# Patient Record
Sex: FEMALE | Race: ASIAN | NOT HISPANIC OR LATINO | URBAN - METROPOLITAN AREA
[De-identification: names, ages, dates, MRNs, and addresses within clinical notes are randomized per-mention and may not be internally consistent; named-entity substitution may affect disease eponyms.]

---

## 2021-11-18 ENCOUNTER — INPATIENT (INPATIENT)
Facility: HOSPITAL | Age: 71
LOS: 0 days | Discharge: HOME | DRG: 83 | End: 2021-11-19
Attending: INTERNAL MEDICINE | Admitting: INTERNAL MEDICINE
Payer: MEDICARE

## 2021-11-18 VITALS
RESPIRATION RATE: 19 BRPM | OXYGEN SATURATION: 98 % | TEMPERATURE: 98 F | DIASTOLIC BLOOD PRESSURE: 86 MMHG | WEIGHT: 130.95 LBS | SYSTOLIC BLOOD PRESSURE: 147 MMHG | HEIGHT: 65 IN | HEART RATE: 108 BPM

## 2021-11-18 LAB
ALBUMIN SERPL ELPH-MCNC: 4.6 G/DL — SIGNIFICANT CHANGE UP (ref 3.3–5)
ALP SERPL-CCNC: 53 U/L — SIGNIFICANT CHANGE UP (ref 40–120)
ALT FLD-CCNC: 25 U/L — SIGNIFICANT CHANGE UP (ref 10–45)
ANION GAP SERPL CALC-SCNC: 12 MMOL/L — SIGNIFICANT CHANGE UP (ref 5–17)
APTT BLD: 33.5 SEC — SIGNIFICANT CHANGE UP (ref 27.5–35.5)
AST SERPL-CCNC: 23 U/L — SIGNIFICANT CHANGE UP (ref 10–40)
BASOPHILS # BLD AUTO: 0.03 K/UL — SIGNIFICANT CHANGE UP (ref 0–0.2)
BASOPHILS NFR BLD AUTO: 0.3 % — SIGNIFICANT CHANGE UP (ref 0–2)
BILIRUB SERPL-MCNC: 0.5 MG/DL — SIGNIFICANT CHANGE UP (ref 0.2–1.2)
BLD GP AB SCN SERPL QL: NEGATIVE — SIGNIFICANT CHANGE UP
BUN SERPL-MCNC: 8 MG/DL — SIGNIFICANT CHANGE UP (ref 7–23)
CALCIUM SERPL-MCNC: 9.2 MG/DL — SIGNIFICANT CHANGE UP (ref 8.4–10.5)
CHLORIDE SERPL-SCNC: 95 MMOL/L — LOW (ref 96–108)
CO2 SERPL-SCNC: 23 MMOL/L — SIGNIFICANT CHANGE UP (ref 22–31)
CREAT SERPL-MCNC: 0.49 MG/DL — LOW (ref 0.5–1.3)
EOSINOPHIL # BLD AUTO: 0.02 K/UL — SIGNIFICANT CHANGE UP (ref 0–0.5)
EOSINOPHIL NFR BLD AUTO: 0.2 % — SIGNIFICANT CHANGE UP (ref 0–6)
GLUCOSE SERPL-MCNC: 227 MG/DL — HIGH (ref 70–99)
HCT VFR BLD CALC: 38.4 % — SIGNIFICANT CHANGE UP (ref 34.5–45)
HGB BLD-MCNC: 12.6 G/DL — SIGNIFICANT CHANGE UP (ref 11.5–15.5)
IMM GRANULOCYTES NFR BLD AUTO: 0.5 % — SIGNIFICANT CHANGE UP (ref 0–1.5)
INR BLD: 1.15 RATIO — SIGNIFICANT CHANGE UP (ref 0.88–1.16)
LYMPHOCYTES # BLD AUTO: 1.4 K/UL — SIGNIFICANT CHANGE UP (ref 1–3.3)
LYMPHOCYTES # BLD AUTO: 11.7 % — LOW (ref 13–44)
MCHC RBC-ENTMCNC: 30.4 PG — SIGNIFICANT CHANGE UP (ref 27–34)
MCHC RBC-ENTMCNC: 32.8 GM/DL — SIGNIFICANT CHANGE UP (ref 32–36)
MCV RBC AUTO: 92.8 FL — SIGNIFICANT CHANGE UP (ref 80–100)
MONOCYTES # BLD AUTO: 0.71 K/UL — SIGNIFICANT CHANGE UP (ref 0–0.9)
MONOCYTES NFR BLD AUTO: 5.9 % — SIGNIFICANT CHANGE UP (ref 2–14)
NEUTROPHILS # BLD AUTO: 9.74 K/UL — HIGH (ref 1.8–7.4)
NEUTROPHILS NFR BLD AUTO: 81.4 % — HIGH (ref 43–77)
NRBC # BLD: 0 /100 WBCS — SIGNIFICANT CHANGE UP (ref 0–0)
PLATELET # BLD AUTO: 262 K/UL — SIGNIFICANT CHANGE UP (ref 150–400)
POTASSIUM SERPL-MCNC: 3.6 MMOL/L — SIGNIFICANT CHANGE UP (ref 3.5–5.3)
POTASSIUM SERPL-SCNC: 3.6 MMOL/L — SIGNIFICANT CHANGE UP (ref 3.5–5.3)
PROT SERPL-MCNC: 7.6 G/DL — SIGNIFICANT CHANGE UP (ref 6–8.3)
PROTHROM AB SERPL-ACNC: 13.7 SEC — HIGH (ref 10.6–13.6)
RBC # BLD: 4.14 M/UL — SIGNIFICANT CHANGE UP (ref 3.8–5.2)
RBC # FLD: 12.1 % — SIGNIFICANT CHANGE UP (ref 10.3–14.5)
RH IG SCN BLD-IMP: POSITIVE — SIGNIFICANT CHANGE UP
RH IG SCN BLD-IMP: POSITIVE — SIGNIFICANT CHANGE UP
SARS-COV-2 RNA SPEC QL NAA+PROBE: SIGNIFICANT CHANGE UP
SODIUM SERPL-SCNC: 130 MMOL/L — LOW (ref 135–145)
WBC # BLD: 11.96 K/UL — HIGH (ref 3.8–10.5)
WBC # FLD AUTO: 11.96 K/UL — HIGH (ref 3.8–10.5)

## 2021-11-18 PROCEDURE — 70450 CT HEAD/BRAIN W/O DYE: CPT | Mod: 26,MA

## 2021-11-18 PROCEDURE — 71275 CT ANGIOGRAPHY CHEST: CPT | Mod: 26,MA

## 2021-11-18 PROCEDURE — 99284 EMERGENCY DEPT VISIT MOD MDM: CPT

## 2021-11-18 PROCEDURE — 93010 ELECTROCARDIOGRAM REPORT: CPT

## 2021-11-18 PROCEDURE — 72125 CT NECK SPINE W/O DYE: CPT | Mod: 26,MA

## 2021-11-18 PROCEDURE — 74177 CT ABD & PELVIS W/CONTRAST: CPT | Mod: 26,MA

## 2021-11-18 RX ORDER — MORPHINE SULFATE 50 MG/1
2 CAPSULE, EXTENDED RELEASE ORAL ONCE
Refills: 0 | Status: DISCONTINUED | OUTPATIENT
Start: 2021-11-18 | End: 2021-11-18

## 2021-11-18 RX ORDER — ACETAMINOPHEN 500 MG
650 TABLET ORAL ONCE
Refills: 0 | Status: COMPLETED | OUTPATIENT
Start: 2021-11-18 | End: 2021-11-18

## 2021-11-18 RX ADMIN — Medication 650 MILLIGRAM(S): at 18:57

## 2021-11-18 NOTE — ED PROVIDER NOTE - NS ED ROS FT
ROS:  -Constitutional: Denies fever  -Head: Denies headache  -Eyes: Denies blurry vision  -Cardiovascular: Denies chest pain  -Pulmonary: Denies shortness of breath  -Gastrointestinal: abdominal pain  -Genitourinary: Denies dysuria  -Skin: Denies new rashes  -Neuro: Denies numbness

## 2021-11-18 NOTE — ED PROVIDER NOTE - RAPID ASSESSMENT
71y F presents to ED s/p fall at 1pm today. Per son, pt went to water plants on 2nd floor and fell down 12 steps today. +LOC. Pt unsure how she fell. Pt seen by PCP who sent her for CT that showed SDH and referred to ED. Reports rib pain as well, had CXR that was reportedly wnml.  Pt was given Tylenol 3 hours PTA for headache. Pt also endorses nausea. On baby aspirin qd. Denies vomiting.    Patient was seen as a tele QDOC patient. The patient will be seen and further worked up in the main emergency department and their care will be completed by the main emergency department team along with a thorough physical exam. Receiving team will follow up on labs, analgesia, any clinical imaging, reassess and disposition as clinically indicated, all decisions regarding the progression of care will be made at their discretion.    Scribe Statement: Alexa GAYLE, attest that this documentation has been prepared under the direction and in the presence of Luca García) 71y F presents to ED s/p fall at 1pm today. Per son, pt went to water plants on 2nd floor and fell down 12 steps today. +LOC. Pt unsure how she fell. Pt seen by PCP who sent her for CT that showed SDH and referred to ED. Reports rib pain as well, had CXR that was reportedly wnml.  Pt was given Tylenol 3 hours PTA for headache. Pt also endorses nausea. On baby aspirin qd. Denies vomiting.    Patient was seen as a tele QDOC patient. The patient will be seen and further worked up in the main emergency department and their care will be completed by the main emergency department team along with a thorough physical exam. Receiving team will follow up on labs, analgesia, any clinical imaging, reassess and disposition as clinically indicated, all decisions regarding the progression of care will be made at their discretion.    Scribe Statement: Alexa GAYLE, attest that this documentation has been prepared under the direction and in the presence of Luca García)    Patient was seen as a QPA patient. The patient will be seen and further worked up in the main emergency department and their care will be completed by the main emergency department team along with a thorough physical exam. Receiving team will follow up on labs, analgesia, any clinical imaging, reassess and disposition as clinically indicated, all decisions regarding the progression of care will be made at their discretion. 71y F presents to ED s/p fall at 1pm today. Per son, pt went to water plants on 2nd floor and fell down 12 steps today. +LOC. Pt unsure how she fell. Pt seen by PCP who sent her for CT that showed SDH and referred to ED. Reports rib pain as well, had CXR that was reportedly wnml.  Pt was given Tylenol 3 hours PTA for headache. Pt also endorses nausea. On baby aspirin qd. Denies vomiting.    Patient was seen as a tele QDOC patient. The patient will be seen and further worked up in the main emergency department and their care will be completed by the main emergency department team along with a thorough physical exam. Receiving team will follow up on labs, analgesia, any clinical imaging, reassess and disposition as clinically indicated, all decisions regarding the progression of care will be made at their discretion.    Scribe Statement: I, Alexa Mccabe, attest that this documentation has been prepared under the direction and in the presence of Luca García (PA)    Patient was seen as a QPA patient. The patient will be seen and further worked up in the main emergency department and their care will be completed by the main emergency department team along with a thorough physical exam. Receiving team will follow up on labs, analgesia, any clinical imaging, reassess and disposition as clinically indicated, all decisions regarding the progression of care will be made at their discretion.    Attending MD Ding: This patient was seen and orders were placed by the PA as per our department's QPA model.  I was not consulted in regards to this patient although I was present and available in the Emergency Department to the PA.  Patient was to be sent to main ED for full medical evaluation and receiving team was to follow up on any labs, analgesia, clinical imaging ordered by the PA.  Any reassessment and disposition decisions were to be made by receiving team as clinically indicated, all decisions regarding the progression of care to be made at their discretion.  I did not perform a comprehensive history and physical on this patient.

## 2021-11-18 NOTE — ED PROVIDER NOTE - CLINICAL SUMMARY MEDICAL DECISION MAKING FREE TEXT BOX
71 year old female with PMH DM, HTN not on AC presents s/p fall at 1 pm today. Per son patient went to water plants on 2nd floor when she passed out without any preceding symptoms and tumbled down 12 stairs. Patient has had episode of syncope in past but states this felt different. Patient was able to ambulate after fall, went in bed and syncopized again. Denies preceding chest pain, shortness of breath, dizziness, abdominal pain. Pt was seen by PCP who did EKG, CXR and referred for outpatient CT head which demonstrated subarachnoid hemorrhage and advised to go to ED. Pt currently c/o pleuritic chest pain, chest wall pain, right hip pain, diffuse back pain. Took Tylenol 3 hours prior to arrival. Evaluating for ICH, fractures, PE, ACS, electrolyte abnormalities. Will do CT, labs, EKG, treat pain and re-evaluate. Zenaida Underwood,  PGY-1   71 year old female with PMH DM, HTN not on AC presents s/p fall at 1 pm today. Per son patient went to water plants on 2nd floor when she passed out without any preceding symptoms and tumbled down 12 stairs. Patient has had episode of syncope in past but states this felt different. Patient was able to ambulate after fall, went in bed and syncopized again. Denies preceding chest pain, shortness of breath, dizziness, abdominal pain. Pt was seen by PCP who did EKG, CXR and referred for outpatient CT head which demonstrated subarachnoid hemorrhage and advised to go to ED. Pt currently c/o pleuritic chest pain, chest wall pain, right hip pain, diffuse back pain. Took Tylenol 3 hours prior to arrival. Evaluating for ICH, fractures, PE, ACS, electrolyte abnormalities. Will do CT, labs, EKG, treat pain and re-evaluate. Zenaida Underwood,  PGY-1   71 year old female with PMH DM, HTN not on AC presents s/p fall at 1 pm today. Per son patient went to water plants on 2nd floor when she passed out without any preceding symptoms and tumbled down 12 stairs. Patient has had episode of syncope in past but states this felt different. Patient was able to ambulate after fall, went in bed and syncopized again. Denies preceding chest pain, shortness of breath, dizziness, abdominal pain. Pt was seen by PCP who did EKG, CXR and referred for outpatient CT head which demonstrated subdural hemorrhage and advised to go to ED. Pt currently c/o pleuritic chest pain, chest wall pain, right hip pain, diffuse back pain. Took Tylenol 3 hours prior to arrival. Evaluating for ICH, fractures, PE, ACS, electrolyte abnormalities. Will do CT, labs, EKG, treat pain and re-evaluate. Zenaida Underwood,  PGY-1   71 year old female with PMH DM, HTN not on AC presents s/p fall at 1 pm today. Per son patient went to water plants on 2nd floor when she passed out without any preceding symptoms and tumbled down 12 stairs. Patient has had episode of syncope in past but states this felt different. Patient was able to ambulate after fall, went in bed and syncopized again. Denies preceding chest pain, shortness of breath, dizziness, abdominal pain. Pt was seen by PCP who did EKG, CXR and referred for outpatient CT head at 3 PM which demonstrated subdural hemorrhage and advised to go to ED. Pt currently c/o pleuritic chest pain, chest wall pain, right hip pain, diffuse back pain. Took Tylenol 3 hours prior to arrival. Evaluating for ICH, fractures, PE, ACS, electrolyte abnormalities. Will do CT, labs, EKG, treat pain and re-evaluate.

## 2021-11-18 NOTE — ED PROVIDER NOTE - OBJECTIVE STATEMENT
71 year old female with PMH DM, HTN not on AC presents s/p fall at 1 pm today. Per son patient went to water plants on 2nd floor when she passed out without any preceding symptoms and tumbled down 12 stairs. Patient has had episode of syncope in past but states this felt different. Denies preceding chest pain, shortness of breath, dizziness, abdominal pain. Pt was seen by PCP who did EKG, CXR and referred for outpatient CT head which demonstrated subarachnoid hemorrhage and advised to go to ED. Pt currently c/o pleural 71 year old female with PMH DM, HTN not on AC presents s/p fall at 1 pm today. Per son patient went to water plants on 2nd floor when she passed out without any preceding symptoms and tumbled down 12 stairs. Patient has had episode of syncope in past but states this felt different. Patient was able to ambulate after fall, went in bed and syncopized again. Denies preceding chest pain, shortness of breath, dizziness, abdominal pain. Pt was seen by PCP who did EKG, CXR and referred for outpatient CT head which demonstrated subarachnoid hemorrhage and advised to go to ED. Pt currently c/o pleural chest pain, chest wall pain, right hip pain, diffuse back pain. Took Tylenol 3 hours prior to arrival. 71 year old female with PMH DM, HTN not on AC presents s/p fall at 1 pm today. Per son patient went to water plants on 2nd floor when she passed out without any preceding symptoms and tumbled down 12 stairs. Patient has had episode of syncope in past but states this felt different. Patient was able to ambulate after fall, went in bed and syncopized again. Denies preceding chest pain, shortness of breath, dizziness, abdominal pain. Pt was seen by PCP who did EKG, CXR and referred for outpatient CT head which demonstrated subdural hemorrhage and advised to go to ED. Pt currently c/o pleural chest pain, chest wall pain, right hip pain, diffuse back pain. Took Tylenol 3 hours prior to arrival. 71 year old female with PMH DM, HTN not on AC presents s/p fall at 1 pm today. Per son patient went to water plants on 2nd floor when she passed out without any preceding symptoms and tumbled down 12 stairs. Patient has had episode of syncope in past but states this felt different. Patient was able to ambulate after fall, went in bed and syncopized again. Denies preceding chest pain, shortness of breath, dizziness, abdominal pain. Pt was seen by PCP who did EKG, CXR and referred for outpatient CT head at 3 PM which demonstrated subdural hemorrhage and advised to go to ED. Pt currently c/o pleural chest pain, chest wall pain, right hip pain, diffuse back pain. Took Tylenol 3 hours prior to arrival.

## 2021-11-18 NOTE — ED ADULT NURSE NOTE - NSIMPLEMENTINTERV_GEN_ALL_ED
Implemented All Fall Risk Interventions:  [place] to call system. Call bell, personal items and telephone within reach. Instruct patient to call for assistance. Room bathroom lighting operational. Non-slip footwear when patient is off stretcher. Physically safe environment: no spills, clutter or unnecessary equipment. Stretcher in lowest position, wheels locked, appropriate side rails in place. Provide visual cue, wrist band, yellow gown, etc. Monitor gait and stability. Monitor for mental status changes and reorient to person, place, and time. Review medications for side effects contributing to fall risk. Reinforce activity limits and safety measures with patient and family.

## 2021-11-18 NOTE — ED PROVIDER NOTE - PHYSICAL EXAMINATION
PHYSICAL EXAM:  CONSTITUTIONAL: Well appearing, awake, alert, oriented to person, place, time/situation and in no apparent distress.  HEAD: No deformities or lesions  NECK: Placed in C-collar  EYES: Clear bilaterally, pupils equal, round and reactive to light.  ENMT: Airway patent, Nasal mucosa clear. Mouth with normal mucosa. Uvula is midline.   CARDIAC: Normal rate, regular rhythm.  +S1/S2.  No murmurs, rubs or gallops.  RESPIRATORY: Breathing unlabored. Breath sounds clear and equal bilaterally.  ABDOMEN:  Soft, nontender, nondistended.   NEUROLOGICAL: Alert and oriented, no focal deficits, no motor or sensory deficits. CN-12 intact. Sensation intact x4 extremities. Strength 5/5 of upper and lower extremities B/L.  EXTREMITIES: Right hip ttp  SKIN: Skin warm and dry. No evidence of rashes or lesions.

## 2021-11-18 NOTE — ED ADULT TRIAGE NOTE - CHIEF COMPLAINT QUOTE
pt had mechanical fall down 10 steps, (+) LOC. Went to PCP had head CT and XRay, was told she had small subdural hematoma and to come to ED  Pt c/o nausea, dizziness, HA.  denies AMS, Vision changes,

## 2021-11-18 NOTE — ED PROVIDER NOTE - ATTENDING CONTRIBUTION TO CARE
71 year old female with PMH DM, HTN not on AC presents s/p fall at 1 pm today down one flight of steps c/o dizziness had outpt ct head by pmd with sdh, no loc, gcs 15, non focal neuro exam, ct pan scan noted to have l rib pain. vss, trauma work up.

## 2021-11-18 NOTE — ED ADULT NURSE NOTE - OBJECTIVE STATEMENT
71 y F 71 y F presents to the ED after pt had fall down 10 steps, (+) LOC. Reports that when she fell she "tumbled". Reports that she does remember the fall but doesn't remember why she fell or how.  Went to PCP had head CT and XRay, was told she had small subdural hematoma and to come to ED. Pt reports nausea, dizziness, HA, rib and neck pain. Denies AMS, Vision changes. Pt placed in C-collar on arrival. On assessment, A&Ox4. PERRL 2 mm. COLON. no facial droop, slurred speech, and arm drift. strength equal in all extremities. Sensation normal in all extremities. Denies lightheadedness, numbness and tingling. Breathing spontaneously and unlabored on Room air. Denies cough, SOB and CP. No Peripheral edema. Cap refill 2s. No JVD. Peripheral pulses strong and equal bilaterally. On cardiac monitor. Denies CP, SOB and palpitations. Abdomen soft, nontender, nondistended, negative CVA tenderness, positive bowel sounds in all four quadrants. Pt is continent. Denies v/d, dysuria, melena and hematuria. IV placed 18g in RAC. Labs drawn and sent. Pt safety maintained. Call bell within reach. Side rails in upward position. Pt awaiting dispo.  Neuroflowhseet placed in chart. Son at bedside for translation.

## 2021-11-18 NOTE — ED ADULT NURSE NOTE - PRO INTERPRETER NEED 2
Called patient and made aware surgery is scheduled for 3/26/2021 at [facility] for 2:30PM. Arrival time is 11:30AM, NPO after midnight. Must have H&P done (sugery H&P form faxed to patient PCP office) and Covid test needs to be done Monday 3/22/2021 at a  phone number to schedule. Patient verbalized understanding. Gerry

## 2021-11-19 ENCOUNTER — TRANSCRIPTION ENCOUNTER (OUTPATIENT)
Age: 71
End: 2021-11-19

## 2021-11-19 VITALS
OXYGEN SATURATION: 99 % | SYSTOLIC BLOOD PRESSURE: 178 MMHG | TEMPERATURE: 98 F | HEART RATE: 71 BPM | DIASTOLIC BLOOD PRESSURE: 85 MMHG | RESPIRATION RATE: 18 BRPM

## 2021-11-19 DIAGNOSIS — R55 SYNCOPE AND COLLAPSE: ICD-10-CM

## 2021-11-19 PROBLEM — Z00.00 ENCOUNTER FOR PREVENTIVE HEALTH EXAMINATION: Status: ACTIVE | Noted: 2021-11-19

## 2021-11-19 PROCEDURE — 74177 CT ABD & PELVIS W/CONTRAST: CPT | Mod: MA

## 2021-11-19 PROCEDURE — 82962 GLUCOSE BLOOD TEST: CPT

## 2021-11-19 PROCEDURE — U0005: CPT

## 2021-11-19 PROCEDURE — 70450 CT HEAD/BRAIN W/O DYE: CPT | Mod: MA

## 2021-11-19 PROCEDURE — 85730 THROMBOPLASTIN TIME PARTIAL: CPT

## 2021-11-19 PROCEDURE — U0003: CPT

## 2021-11-19 PROCEDURE — 83735 ASSAY OF MAGNESIUM: CPT

## 2021-11-19 PROCEDURE — 86901 BLOOD TYPING SEROLOGIC RH(D): CPT

## 2021-11-19 PROCEDURE — 86900 BLOOD TYPING SEROLOGIC ABO: CPT

## 2021-11-19 PROCEDURE — 85025 COMPLETE CBC W/AUTO DIFF WBC: CPT

## 2021-11-19 PROCEDURE — 70450 CT HEAD/BRAIN W/O DYE: CPT | Mod: 26

## 2021-11-19 PROCEDURE — 83880 ASSAY OF NATRIURETIC PEPTIDE: CPT

## 2021-11-19 PROCEDURE — 80053 COMPREHEN METABOLIC PANEL: CPT

## 2021-11-19 PROCEDURE — 86850 RBC ANTIBODY SCREEN: CPT

## 2021-11-19 PROCEDURE — 85610 PROTHROMBIN TIME: CPT

## 2021-11-19 PROCEDURE — 71275 CT ANGIOGRAPHY CHEST: CPT | Mod: MA

## 2021-11-19 PROCEDURE — 84100 ASSAY OF PHOSPHORUS: CPT

## 2021-11-19 PROCEDURE — 72125 CT NECK SPINE W/O DYE: CPT | Mod: MA

## 2021-11-19 PROCEDURE — 84484 ASSAY OF TROPONIN QUANT: CPT

## 2021-11-19 PROCEDURE — 99285 EMERGENCY DEPT VISIT HI MDM: CPT

## 2021-11-19 RX ORDER — DEXTROSE 50 % IN WATER 50 %
25 SYRINGE (ML) INTRAVENOUS ONCE
Refills: 0 | Status: DISCONTINUED | OUTPATIENT
Start: 2021-11-19 | End: 2021-11-19

## 2021-11-19 RX ORDER — AMLODIPINE BESYLATE 2.5 MG/1
0 TABLET ORAL
Qty: 0 | Refills: 0 | DISCHARGE

## 2021-11-19 RX ORDER — AMLODIPINE BESYLATE 2.5 MG/1
5 TABLET ORAL DAILY
Refills: 0 | Status: DISCONTINUED | OUTPATIENT
Start: 2021-11-19 | End: 2021-11-19

## 2021-11-19 RX ORDER — RISEDRONATE SODIUM 25.8; 4.2 MG/1; MG/1
1 TABLET, FILM COATED ORAL
Qty: 0 | Refills: 0 | DISCHARGE

## 2021-11-19 RX ORDER — REPAGLINIDE 1 MG/1
1 TABLET ORAL
Qty: 0 | Refills: 0 | DISCHARGE

## 2021-11-19 RX ORDER — SODIUM CHLORIDE 9 MG/ML
1000 INJECTION, SOLUTION INTRAVENOUS
Refills: 0 | Status: DISCONTINUED | OUTPATIENT
Start: 2021-11-19 | End: 2021-11-19

## 2021-11-19 RX ORDER — DEXTROSE 50 % IN WATER 50 %
15 SYRINGE (ML) INTRAVENOUS ONCE
Refills: 0 | Status: DISCONTINUED | OUTPATIENT
Start: 2021-11-19 | End: 2021-11-19

## 2021-11-19 RX ORDER — INSULIN LISPRO 100/ML
VIAL (ML) SUBCUTANEOUS
Refills: 0 | Status: DISCONTINUED | OUTPATIENT
Start: 2021-11-19 | End: 2021-11-19

## 2021-11-19 RX ORDER — GLUCAGON INJECTION, SOLUTION 0.5 MG/.1ML
1 INJECTION, SOLUTION SUBCUTANEOUS ONCE
Refills: 0 | Status: DISCONTINUED | OUTPATIENT
Start: 2021-11-19 | End: 2021-11-19

## 2021-11-19 RX ORDER — ATORVASTATIN CALCIUM 80 MG/1
1 TABLET, FILM COATED ORAL
Qty: 0 | Refills: 0 | DISCHARGE

## 2021-11-19 RX ORDER — DEXTROSE 50 % IN WATER 50 %
12.5 SYRINGE (ML) INTRAVENOUS ONCE
Refills: 0 | Status: DISCONTINUED | OUTPATIENT
Start: 2021-11-19 | End: 2021-11-19

## 2021-11-19 RX ORDER — SITAGLIPTIN AND METFORMIN HYDROCHLORIDE 500; 50 MG/1; MG/1
1 TABLET, FILM COATED ORAL
Qty: 0 | Refills: 0 | DISCHARGE

## 2021-11-19 RX ADMIN — Medication 650 MILLIGRAM(S): at 17:05

## 2021-11-19 NOTE — DISCHARGE NOTE PROVIDER - NSDCMRMEDTOKEN_GEN_ALL_CORE_FT
Janumet 50 mg-1000 mg oral tablet: 1 tab(s) orally 2 times a day  Lipitor 20 mg oral tablet: 1 tab(s) orally once a day  Norvasc: local pharmacy has no record of med - son states she is currently taking  Prandin 1 mg oral tablet: 1 tab(s) orally once a day  risedronate 35 mg oral tablet: 1 tab(s) orally once a week

## 2021-11-19 NOTE — CONSULT NOTE ADULT - SUBJECTIVE AND OBJECTIVE BOX
p (1480)     HPI:    71F on ASA 81 s/p fall with LOC. CTH shows small interhemispheric SDH. Repeat CTH stable. Exam: AAOx3, COLON 5/5, no drift, EOMI.  --Anticoagulation:    =====================  PAST MEDICAL HISTORY   DM (diabetes mellitus)    HTN (hypertension)      PAST SURGICAL HISTORY         MEDICATIONS:  Antibiotics:    Neuro:    Other:      SOCIAL HISTORY:   Occupation:   Marital Status:     FAMILY HISTORY:      ROS: Negative except per HPI    LABS:  PT/INR - ( 18 Nov 2021 18:28 )   PT: 13.7 sec;   INR: 1.15 ratio         PTT - ( 18 Nov 2021 18:28 )  PTT:33.5 sec                        12.6   11.96 )-----------( 262      ( 18 Nov 2021 18:28 )             38.4     11-18    130<L>  |  95<L>  |  8   ----------------------------<  227<H>  3.6   |  23  |  0.49<L>    Ca    9.2      18 Nov 2021 18:28  Phos  2.4     11-18  Mg     2.1     11-18    TPro  7.6  /  Alb  4.6  /  TBili  0.5  /  DBili  x   /  AST  23  /  ALT  25  /  AlkPhos  53  11-18

## 2021-11-19 NOTE — DISCHARGE NOTE NURSING/CASE MANAGEMENT/SOCIAL WORK - PATIENT PORTAL LINK FT
You can access the FollowMyHealth Patient Portal offered by [facility] by registering at the following website: [email or web address]. By joining [facility]’s FollowMyHealth portal, you will also be able to view your health information using other applications (apps) compatible with our system.

## 2021-11-19 NOTE — DISCHARGE NOTE PROVIDER - CARE PROVIDER_API CALL
Brayan Claire  INTERNAL MEDICINE  [address] [place] [address]  Phone: [phone]  Fax: [phone]  Established Patient  Follow Up Time: 1-3 days

## 2021-11-19 NOTE — CONSULT NOTE ADULT - ASSESSMENT
David, Lebron    71F on ASA 81 s/p fall with LOC. CTH shows small interhemispheric SDH. Repeat CTH stable. Exam: AAOx3, COLON 5/5, no drift, EOMI.  -Repeat CTH stable  -Monitor in CDU overnight for neurochecks, DC in AM  -No keppra, no ddavp, no placements  -followup outpatient with dr. ramírez in 2 weeks.
Syncope  unclear etiology   ? due to hypovolemia  obtain echo   monitor on tele   Neuro work up   will hold off to stress test or ischemic eval given SDH    HTN  stable  cont current meds    Dm II  Monitor finger stick. Insulin coverage. Diabetic education and Diabetic diet. Consider nutrition consultation.    SDH  as per nsx     Advanced care planning was discussed with patient and family.  Risks, benefits and alternatives of the cardiac treatments and medical therapy including procedures were discussed in detail and all questions were answered. Importance of compliance with medical therapy and lifestyle modification to improve cardiovascular health were addressed. Appropriate forms and patient educational materials were reviewed. 30 minutes face to face spent.     
  71 year old female with PMH DM, HTN not on AC presents s/p fall at 1 pm today. Per son patient went to water plants on 2nd floor when she passed out without any preceding symptoms and tumbled down 12 stairs. Patient has had episode of syncope in past but states this felt different. Patient was able to ambulate after fall, went in bed and syncopized again. Denies preceding chest pain, shortness of breath, dizziness, abdominal pain. Pt was seen by PCP who did EKG, CXR and referred for outpatient CT head at 3 PM which demonstrated subdural hemorrhage and advised to go to ED. Pt currently c/o pleural chest pain, chest wall pain, right hip pain, diffuse back pain. Took Tylenol 3 hours prior to arrival. (19 Nov 2021 11:48)      hyponatremia will check ua , urine osmolality , urine sodium , urine uric acid , serum sodium , serum osmolality , serum uric acid , f/u with hyponatremia work up , f/u with bmp , monitor i and o    BP monitoring,continue current antihypertensive meds, ,followup with PMD in 1-2 weeks  amLODIPine   Tablet 5 milliGRAM(s) Oral daily      Accuchecks monitoring and insulin sliding scale coverage, no concentrated sweets diet, serial labs and f/up with PMD, monitor HB A 1 C every 3-4 mnth

## 2021-11-19 NOTE — DISCHARGE NOTE PROVIDER - NSDCCPCAREPLAN_GEN_ALL_CORE_FT
PRINCIPAL DISCHARGE DIAGNOSIS  Diagnosis: Syncope  Assessment and Plan of Treatment: Keep outpatient MRI Brain appt for tomorrow per your pcp. follow up with your pcp  return if worsens

## 2021-11-19 NOTE — CONSULT NOTE ADULT - SUBJECTIVE AND OBJECTIVE BOX
CHIEF COMPLAINT:Patient is a 71y old  Female who presents with a chief complaint of fall (19 Nov 2021 11:48)      HISTORY OF PRESENT ILLNESS:  71 female with history of HTN s/p unwitnessed fall then stood up and had syncope   pt does not remember the event but has occasional dizziness  CT shows SDH   no cp   no palpitation   no fever  no cough     PAST MEDICAL & SURGICAL HISTORY:  DM (diabetes mellitus)    HTN (hypertension)    No significant past surgical history            MEDICATIONS:  amLODIPine   Tablet 5 milliGRAM(s) Oral daily            dextrose 40% Gel 15 Gram(s) Oral once  dextrose 50% Injectable 25 Gram(s) IV Push once  dextrose 50% Injectable 12.5 Gram(s) IV Push once  dextrose 50% Injectable 25 Gram(s) IV Push once  glucagon  Injectable 1 milliGRAM(s) IntraMuscular once  insulin lispro (ADMELOG) corrective regimen sliding scale   SubCutaneous three times a day before meals    dextrose 5%. 1000 milliLiter(s) IV Continuous <Continuous>  dextrose 5%. 1000 milliLiter(s) IV Continuous <Continuous>      FAMILY HISTORY:  No pertinent family history in first degree relatives        Non-contributory    SOCIAL HISTORY:    No tobacco, drugs or etoh    Allergies    No Known Allergies    Intolerances    	    REVIEW OF SYSTEMS:  as above  The rest of the 14 points ROS reviewed and except above they are unremarkable.        PHYSICAL EXAM:  T(C): 36.6 (11-19-21 @ 11:58), Max: 36.9 (11-18-21 @ 17:05)  HR: 71 (11-19-21 @ 11:58) (71 - 108)  BP: 155/81 (11-19-21 @ 11:58) (138/71 - 170/80)  RR: 18 (11-19-21 @ 11:58) (17 - 21)  SpO2: 98% (11-19-21 @ 11:58) (98% - 100%)  Wt(kg): --  I&O's Summary      JVP: Normal  Neck: supple  Lung: clear   CV: S1 S2 , Murmur:  Abd: soft  Ext: No edema  neuro: Awake / alert  Psych: flat affect  Skin: normal      LABS/DATA:    TELEMETRY: 	    ECG:  	sinus ? old inferior wall MI    	  CARDIAC MARKERS:                        7 <<== 11-18-21 @ 18:28                              12.6   11.96 )-----------( 262      ( 18 Nov 2021 18:28 )             38.4     11-18    130<L>  |  95<L>  |  8   ----------------------------<  227<H>  3.6   |  23  |  0.49<L>    Ca    9.2      18 Nov 2021 18:28  Phos  2.4     11-18  Mg     2.1     11-18    TPro  7.6  /  Alb  4.6  /  TBili  0.5  /  DBili  x   /  AST  23  /  ALT  25  /  AlkPhos  53  11-18    proBNP: Serum Pro-Brain Natriuretic Peptide: 134 pg/mL (11-18 @ 18:28)    Lipid Profile:   HgA1c:   TSH:

## 2021-11-19 NOTE — CONSULT NOTE ADULT - SUBJECTIVE AND OBJECTIVE BOX
[facility]), [facility]        Patient is a 71y old  Female who presents with a chief complaint of   Excerpt from H&P,"  72 y/o with hx of htn uncontrolled syncope fell down the stairs, and then passed out again after her  brought her to the bed.   pt reports headache and back pain since her fall   denies any weakness, numbness or other neurologic symptoms                  *****PAST MEDICAL / Surgical  HISTORY:  PAST MEDICAL & SURGICAL HISTORY:  DM (diabetes mellitus)    HTN (hypertension)             *****FAMILY HISTORY:  FAMILY HISTORY:           *****SOCIAL HISTORY:  Alcohol: None  Smoking: None         *****ALLERGIES:   Allergies    No Known Allergies    Intolerances             *****MEDICATIONS: current medication reviewed and documented.   MEDICATIONS  (STANDING):    MEDICATIONS  (PRN):           *****REVIEW OF SYSTEM:  GEN: no fever, no chills, no pain  RESP: no SOB, no cough, no sputum  CVS: no chest pain, no palpitations, no edema  GI: no abdominal pain, no nausea, no vomiting, no constipation, no diarrhea  : no dysurea, no frequency, no hematurea  Neuro: no headache, no dizziness  PSYCH: no anxiety, no depression  Derm : no itching, no rash         *****VITAL SIGNS:  T(C): 36.6 (21 @ 08:48), Max: 36.9 (21 @ 17:05)  HR: 74 (21 @ 08:48) (71 - 108)  BP: 155/84 (21 @ 08:48) (138/71 - 170/80)  RR: 17 (21 @ 08:48) (17 - 21)  SpO2: 100% (21 @ 08:48) (98% - 100%)  Wt(kg): --           *****PHYSICAL EXAM:   Alert oriented x 2   Attention comprehension are intact . Able to name, repeat,   without any difficulty.   Able to follow 1  step commands.     EOMI fundi not visualized,  blinks to threat   No facial asymmetry   Tongue is midline      Moving all 4 ext symmetrically no pronator drift   Reflexes are symmetric throughout   sensation is grossly symmetric  Gait : not assessed.  B/L down going toes               *****LAB AND IMAGIN.6   11.96 )-----------( 262      ( 2021 18:28 )             38.4               11-18    130<L>  |  95<L>  |  8   ----------------------------<  227<H>  3.6   |  23  |  0.49<L>    Ca    9.2      2021 18:28  Phos  2.4     1118  Mg     2.1     18    TPro  7.6  /  Alb  4.6  /  TBili  0.5  /  DBili  x   /  AST  23  /  ALT  25  /  AlkPhos  53  11-18    PT/INR - ( 2021 18:28 )   PT: 13.7 sec;   INR: 1.15 ratio         PTT - ( 2021 18:28 )  PTT:33.5 sec                      < from: CT Head No Cont (1118.21 @ 21:23) >  There is no evidence for acute fracture, traumatic subluxation, or prevertebral swelling.    The craniocervical junction is unremarkable. The normal cervical lordosis is maintained.    Mild vertebral body height loss of C5 and C6. Multilevel degenerative changes with osteophyte formation, intervertebral disc space loss, and facet and uncinate joint arthrosis predominantly involving C4 to C6. Mild neural foraminal narrowing at the C5-6 level on the left.    Visualized portions of the lungs are clear.    A 3.2 cm dominant right thyroid lobe nodule with mild leftward deviation of the trachea.        IMPRESSION:  Head CT: Stable small anterior parafalcine, acute subdural hematoma. No midline shift or mass effect.    Cervical spine CT: No evidence for acute displaced fracture or traumatic malalignment.    --- End of Report ---        < end of copied text >        [All pertinent recent Imaging reports reviewed]         *****A S S E S S M E N T   A N D   P L A N :      Excerpt from H&P,"  72 y/o with hx of htn uncontrolled syncope fell down the stairs, and then passed out again after her  brought her to the bed.   pt reports headache and back pain since her fall   denies any weakness, numbness or other neurologic symptoms         Problem/Recommendations 1:  HTN uncontrolled   pt with extensive white matter disease on ct head   please start controlling bp gradually to normotensive  mri/mra to eval for cerebral pathology ordered       Problem/Recommendations 2:syncope   of unclear etiology   post traumatic subfalcine hemorrhage small   repeat ct in 4 hrs to establish stability   no indication for any surgical intervention   avoid any antiplatelet rx for now         Problem/Recommendations 3: hyponatremia   of unclear etiology   urine osm, serum osm   urine lytes   trend    Problem/Recommendations 4: headache   likely post concussive   will get mri   ___________________________  Will follow with you.  Thank you,  Swetha Yanes MD  Diplomate of the American Board of Neurology and Psychiatry.  Diplomate of the American Board of Vascular Neurology.   [facility] ([facility]), [facility]   Ph: [phone]    Differential diagnosis and plan of care discussed with patient after the evaluation.   Advanced care planning options discussed.   Pain assessed and judicious use of narcotics when appropriate was discussed.  Importance of Fall prevention discussed.  Counseling on Smoking and Alcohol cessation was offered when appropriate.  Counseling on Diet, exercise, and medication compliance was done.   83 minutes spent on the total encounter;  more than 50 % of the visit was spent on counseling  and or coordinating care by the attending physician.    Thank you for allowing me to participate in the care of this gilberto patient. Please do not hesitate to call me if you have any questions.     This and subsequent notes  will  inherently be subject to errors including those of syntax and substitutions which may escape proofreading. In such instances original meaning may be extrapolated by contextual derivation.

## 2021-11-19 NOTE — CONSULT NOTE ADULT - SUBJECTIVE AND OBJECTIVE BOX
Patient is a 71y Female whom presented to the hospital with hyponatremia     PAST MEDICAL & SURGICAL HISTORY:  DM (diabetes mellitus)    HTN (hypertension)    No significant past surgical history        MEDICATIONS  (STANDING):  amLODIPine   Tablet 5 milliGRAM(s) Oral daily  dextrose 40% Gel 15 Gram(s) Oral once  dextrose 5%. 1000 milliLiter(s) (50 mL/Hr) IV Continuous <Continuous>  dextrose 5%. 1000 milliLiter(s) (100 mL/Hr) IV Continuous <Continuous>  dextrose 50% Injectable 25 Gram(s) IV Push once  dextrose 50% Injectable 12.5 Gram(s) IV Push once  dextrose 50% Injectable 25 Gram(s) IV Push once  glucagon  Injectable 1 milliGRAM(s) IntraMuscular once  insulin lispro (ADMELOG) corrective regimen sliding scale   SubCutaneous three times a day before meals      Allergies    No Known Allergies    Intolerances        SOCIAL HISTORY:  Denies ETOh,Smoking,     FAMILY HISTORY:  No pertinent family history in first degree relatives        REVIEW OF SYSTEMS:    CONSTITUTIONAL: No weakness, fevers or chills  RESPIRATORY: No cough, wheezing, hemoptysis; No shortness of breath  CARDIOVASCULAR: No chest pain or palpitations  GASTROINTESTINAL: No abdominal or epigastric pain. No nausea, vomiting,    GENITOURINARY: No dysuria, frequency or hematuria  SKIN: dry      VITAL:  T(C): , Max: 36.9 (11-19-21 @ 19:37)  T(F): , Max: 98.5 (11-19-21 @ 19:37)  HR: 71 (11-19-21 @ 19:37)  BP: 178/85 (11-19-21 @ 19:37)  BP(mean): --  RR: 18 (11-19-21 @ 19:37)  SpO2: 99% (11-19-21 @ 19:37)  Wt(kg): --    I and O's:        PHYSICAL EXAM:    Constitutional: NAD  HEENT: conjunctive   clear   Neck:  No JVD  Respiratory: CTAB  Cardiovascular: S1 and S2  Gastrointestinal: BS+, soft, NT/ND  Extremities: No peripheral edema    LABS:                        12.6   11.96 )-----------( 262      ( 18 Nov 2021 18:28 )             38.4     11-18    130<L>  |  95<L>  |  8   ----------------------------<  227<H>  3.6   |  23  |  0.49<L>    Ca    9.2      18 Nov 2021 18:28  Phos  2.4     11-18  Mg     2.1     11-18    TPro  7.6  /  Alb  4.6  /  TBili  0.5  /  DBili  x   /  AST  23  /  ALT  25  /  AlkPhos  53  11-18      Urine Studies:          RADIOLOGY & ADDITIONAL STUDIES:                 pt seen and examined  this am   Patient is a 71y Female whom presented to the hospital with hyponatremia     PAST MEDICAL & SURGICAL HISTORY:  DM (diabetes mellitus)    HTN (hypertension)    No significant past surgical history        MEDICATIONS  (STANDING):  amLODIPine   Tablet 5 milliGRAM(s) Oral daily  dextrose 40% Gel 15 Gram(s) Oral once  dextrose 5%. 1000 milliLiter(s) (50 mL/Hr) IV Continuous <Continuous>  dextrose 5%. 1000 milliLiter(s) (100 mL/Hr) IV Continuous <Continuous>  dextrose 50% Injectable 25 Gram(s) IV Push once  dextrose 50% Injectable 12.5 Gram(s) IV Push once  dextrose 50% Injectable 25 Gram(s) IV Push once  glucagon  Injectable 1 milliGRAM(s) IntraMuscular once  insulin lispro (ADMELOG) corrective regimen sliding scale   SubCutaneous three times a day before meals      Allergies    No Known Allergies    Intolerances        SOCIAL HISTORY:  Denies ETOh,Smoking,     FAMILY HISTORY:  No pertinent family history in first degree relatives        REVIEW OF SYSTEMS:    CONSTITUTIONAL: No weakness, fevers or chills  RESPIRATORY: No cough, wheezing, hemoptysis; No shortness of breath  CARDIOVASCULAR: No chest pain or palpitations  GASTROINTESTINAL: No abdominal or epigastric pain. No nausea, vomiting,    GENITOURINARY: No dysuria, frequency or hematuria  SKIN: dry      VITAL:  T(C): , Max: 36.9 (11-19-21 @ 19:37)  T(F): , Max: 98.5 (11-19-21 @ 19:37)  HR: 71 (11-19-21 @ 19:37)  BP: 178/85 (11-19-21 @ 19:37)  BP(mean): --  RR: 18 (11-19-21 @ 19:37)  SpO2: 99% (11-19-21 @ 19:37)  Wt(kg): --    I and O's:        PHYSICAL EXAM:    Constitutional: NAD  HEENT: conjunctive   clear   Neck:  No JVD  Respiratory: CTAB  Cardiovascular: S1 and S2  Gastrointestinal: BS+, soft, NT/ND  Extremities: No peripheral edema    LABS:                        12.6   11.96 )-----------( 262      ( 18 Nov 2021 18:28 )             38.4     11-18    130<L>  |  95<L>  |  8   ----------------------------<  227<H>  3.6   |  23  |  0.49<L>    Ca    9.2      18 Nov 2021 18:28  Phos  2.4     11-18  Mg     2.1     11-18    TPro  7.6  /  Alb  4.6  /  TBili  0.5  /  DBili  x   /  AST  23  /  ALT  25  /  AlkPhos  53  11-18      Urine Studies:          RADIOLOGY & ADDITIONAL STUDIES:

## 2021-11-19 NOTE — H&P ADULT - HISTORY OF PRESENT ILLNESS
71 year old female with PMH DM, HTN not on AC presents s/p fall at 1 pm today. Per son patient went to water plants on 2nd floor when she passed out without any preceding symptoms and tumbled down 12 stairs. Patient has had episode of syncope in past but states this felt different. Patient was able to ambulate after fall, went in bed and syncopized again. Denies preceding chest pain, shortness of breath, dizziness, abdominal pain. Pt was seen by PCP who did EKG, CXR and referred for outpatient CT head at 3 PM which demonstrated subdural hemorrhage and advised to go to ED. Pt currently c/o pleural chest pain, chest wall pain, right hip pain, diffuse back pain. Took Tylenol 3 hours prior to arrival.

## 2021-11-19 NOTE — ED ADULT NURSE REASSESSMENT NOTE - NS ED NURSE REASSESS COMMENT FT1
Patient spouse at bedside saying patient wants to go home. Patient reports feeling well. Patient is mentating well. Inpatient team reached out to [phone] is inpatient NP. Inpatient team have placed provider to RN for patient to be discharged now but discharge document is not complete. Night shift RN Michael will AMA patient and remove IV when discharge documents are complete from inpatient MD/NP.
Patient is resting is the stretcher. Neurologically at baseline. No deficits noted. Neuro assessment documented every 4 hours in paper chart.
timur assessment completed in paper chart. Patient denies dizziness at this time, does endorse an unchanged headache. No numbness or tingling. No shortness of breath or difficulty breathing. No chest pain, pressure or palpitations. No abdominal pain, nausea or vomiting. Son at bedside translating for patient as per family request

## 2021-11-19 NOTE — H&P ADULT - NSHPPHYSICALEXAM_GEN_ALL_CORE
General: WN/WD NAD  PERRLA  Neurology: A&Ox3, nonfocal, COLON x 4  Respiratory: CTA B/L  CV: RRR, S1S2, no murmurs, rubs or gallops  Abdominal: Soft, NT, ND +BS, Last BM  Extremities: No edema, + peripheral pulses  Skin Normal

## 2021-11-19 NOTE — DISCHARGE NOTE PROVIDER - HOSPITAL COURSE
71 year old female with PMH DM, HTN not on AC presents s/p fall at 1 pm today. Per son patient went to water plants on 2nd floor when she passed out without any preceding symptoms and tumbled down 12 stairs. Patient has had episode of syncope in past but states this felt different. Patient was able to ambulate after fall, went in bed and syncopized again. Denies preceding chest pain, shortness of breath, dizziness, abdominal pain. Pt was seen by PCP who did EKG, CXR and referred for outpatient CT head at 3 PM which demonstrated subdural hemorrhage and advised to go to ED. Pt currently c/o pleural chest pain, chest wall pain, right hip pain, diffuse back pain. Took Tylenol 3 hours prior to arrival.  1 Fall  post traumatic subfalcine hemorrhage small   neuuro and neurosurgery fu  repeat CT in 24 hours  ro syncope  cards called     2 HTN  DASH diet  cw home meds    3 DM  monitoR  FS   ISS    Cleared for d/c home by dr Chiang who spoke to with patients PCP who arranged outpatient MRI in am

## 2021-11-19 NOTE — H&P ADULT - ASSESSMENT
71 year old female with PMH DM, HTN not on AC presents s/p fall at 1 pm today. Per son patient went to water plants on 2nd floor when she passed out without any preceding symptoms and tumbled down 12 stairs. Patient has had episode of syncope in past but states this felt different. Patient was able to ambulate after fall, went in bed and syncopized again. Denies preceding chest pain, shortness of breath, dizziness, abdominal pain. Pt was seen by PCP who did EKG, CXR and referred for outpatient CT head at 3 PM which demonstrated subdural hemorrhage and advised to go to ED. Pt currently c/o pleural chest pain, chest wall pain, right hip pain, diffuse back pain. Took Tylenol 3 hours prior to arrival.    1 Fall  post traumatic subfalcine hemorrhage small   neuuro and neurosurgery fu  repeat CT in 24 hours  ro syncope  cards called     2 HTN  DASH diet  cw home meds    3 DM  monitoR  FS   ISS

## 2021-12-02 PROBLEM — I10 ESSENTIAL (PRIMARY) HYPERTENSION: Chronic | Status: ACTIVE | Noted: 2021-11-18

## 2021-12-02 PROBLEM — E11.9 TYPE 2 DIABETES MELLITUS WITHOUT COMPLICATIONS: Chronic | Status: ACTIVE | Noted: 2021-11-18

## 2021-12-06 ENCOUNTER — APPOINTMENT (OUTPATIENT)
Dept: SPINE | Facility: CLINIC | Age: 71
End: 2021-12-06
Payer: MEDICARE

## 2021-12-06 VITALS
HEART RATE: 105 BPM | WEIGHT: 135 LBS | OXYGEN SATURATION: 99 % | HEIGHT: 63 IN | BODY MASS INDEX: 23.92 KG/M2 | TEMPERATURE: 98.2 F | DIASTOLIC BLOOD PRESSURE: 89 MMHG | SYSTOLIC BLOOD PRESSURE: 180 MMHG

## 2021-12-06 DIAGNOSIS — E11.59 TYPE 2 DIABETES MELLITUS WITH OTHER CIRCULATORY COMPLICATIONS: ICD-10-CM

## 2021-12-06 DIAGNOSIS — Z87.39 PERSONAL HISTORY OF OTHER DISEASES OF THE MUSCULOSKELETAL SYSTEM AND CONNECTIVE TISSUE: ICD-10-CM

## 2021-12-06 DIAGNOSIS — M54.9 DORSALGIA, UNSPECIFIED: ICD-10-CM

## 2021-12-06 DIAGNOSIS — Z86.79 PERSONAL HISTORY OF OTHER DISEASES OF THE CIRCULATORY SYSTEM: ICD-10-CM

## 2021-12-06 DIAGNOSIS — G89.29 DORSALGIA, UNSPECIFIED: ICD-10-CM

## 2021-12-06 DIAGNOSIS — S06.5X9A TRAUMATIC SUBDURAL HEMORRHAGE WITH LOSS OF CONSCIOUSNESS OF UNSPECIFIED DURATION, INITIAL ENCOUNTER: ICD-10-CM

## 2021-12-06 DIAGNOSIS — Z86.39 PERSONAL HISTORY OF OTHER ENDOCRINE, NUTRITIONAL AND METABOLIC DISEASE: ICD-10-CM

## 2021-12-06 PROCEDURE — 99203 OFFICE O/P NEW LOW 30 MIN: CPT

## 2021-12-06 NOTE — ASSESSMENT
[FreeTextEntry1] : Stable small interhemispheric SDH and no change on serial CT scan.  Asymptomatic.  neurologically intact.Follow up PRN. I evaluated and examined this patient along with the nurse practitioner and we agreed on the plan of care.\par \par \par \par PCP\par Brayan Otoole MD\par [phone] [place] [address]\[place] [address]

## 2021-12-06 NOTE — END OF VISIT
[FreeTextEntry3] : I, Dr. Tito Vieyra, evaluated this patient with the Nurse Practitioner, Kim Lombardo, and established the plan of care.  I personally discussed this patient  during the key portions of the history and exam with the Nurse Practitioner at the time of the visit.  I agree with the assessment and plan as written, unless noted below.\par

## 2021-12-06 NOTE — DATA REVIEWED
[de-identified] : Head CT scan from [facility] 11/20/2021

## 2021-12-06 NOTE — PHYSICAL EXAM
[Person] : oriented to person [Place] : oriented to place [Time] : oriented to time [Short Term Intact] : short term memory intact [Remote Intact] : remote memory intact [Span Intact] : the attention span was normal [Concentration Intact] : normal concentrating ability [Fluency] : fluency intact [Comprehension] : comprehension intact [Current Events] : adequate knowledge of current events [Past History] : adequate knowledge of personal past history [Vocabulary] : adequate range of vocabulary [Cranial Nerves Optic (II)] : visual acuity intact bilaterally,  pupils equal round and reactive to light [Cranial Nerves Oculomotor (III)] : extraocular motion intact [Cranial Nerves Trigeminal (V)] : facial sensation intact symmetrically [Cranial Nerves Facial (VII)] : face symmetrical [Cranial Nerves Vestibulocochlear (VIII)] : hearing was intact bilaterally [Cranial Nerves Glossopharyngeal (IX)] : tongue and palate midline [Cranial Nerves Accessory (XI - Cranial And Spinal)] : head turning and shoulder shrug symmetric [Cranial Nerves Hypoglossal (XII)] : there was no tongue deviation with protrusion [Motor Tone] : muscle tone was normal in all four extremities [Motor Strength] : muscle strength was normal in all four extremities [No Muscle Atrophy] : normal bulk in all four extremities [Sensation Tactile Decrease] : light touch was intact [Abnormal Walk] : normal gait [Balance] : balance was intact [Past-pointing] : there was no past-pointing [Tremor] : no tremor present [2+] : Ankle jerk left 2+ [Saunders] : Saunders's sign was not demonstrated [No Tenderness to Palpation] : no spine tenderness on palpation

## 2021-12-06 NOTE — CONSULT LETTER
[Dear  ___] : Dear  [unfilled], [Consult Letter:] : I had the pleasure of evaluating your patient, [unfilled]. [Please see my note below.] : Please see my note below. [Consult Closing:] : Thank you very much for allowing me to participate in the care of this patient.  If you have any questions, please do not hesitate to contact me. [Sincerely,] : Sincerely, [FreeTextEntry3] : Tito Vieyra MD\par Chief of Neurosurgery [facility]\par [facility]\par

## 2021-12-06 NOTE — REASON FOR VISIT
[New Patient Visit] : a new patient visit [Family Member] : family member [Referred By: _________] : Patient was referred by ANA [FreeTextEntry1] : Mechanical fall and small SDH

## 2021-12-06 NOTE — HISTORY OF PRESENT ILLNESS
[< 3 months] : less than 3 months [FreeTextEntry1] : SDH [de-identified] : \par Ms Hernandez is a 71-year-old woman here after a mechanical fall and sustaining a small interhemispheric SDH.  Her fall was on November 18, 2021.  There was no loss of consciousness after her fall down a flight of stairs. She is not describing any headache, numbness, tingling weakness.  She had initially seen her PCP and a CT scan showed a small interhemispheric SDH.  She was then sent to the ED.   She was discharged after multiple CT scans that showed stable SDH and no increase in size.  Today she has no HA or weakness or unsteadiness.   No neurologic symptoms are reported.  Followup MRI, MRA and CT scan showed only a residual small interhemispheric subdural hematoma.

## 2025-04-21 NOTE — ED ADULT TRIAGE NOTE - INTERNATIONAL TRAVEL
No no fever/no chills/no sweating/no anorexia/no weight loss/no weight gain/no polyphagia/no polyuria